# Patient Record
Sex: MALE | Race: WHITE | NOT HISPANIC OR LATINO | URBAN - METROPOLITAN AREA
[De-identification: names, ages, dates, MRNs, and addresses within clinical notes are randomized per-mention and may not be internally consistent; named-entity substitution may affect disease eponyms.]

---

## 2019-09-15 ENCOUNTER — EMERGENCY (EMERGENCY)
Facility: HOSPITAL | Age: 34
LOS: 0 days | Discharge: HOME | End: 2019-09-15
Attending: EMERGENCY MEDICINE
Payer: COMMERCIAL

## 2019-09-15 VITALS
OXYGEN SATURATION: 100 % | DIASTOLIC BLOOD PRESSURE: 88 MMHG | HEART RATE: 87 BPM | SYSTOLIC BLOOD PRESSURE: 144 MMHG | RESPIRATION RATE: 18 BRPM

## 2019-09-15 VITALS — HEIGHT: 67 IN | WEIGHT: 210.1 LBS
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
## 2019-09-15 DIAGNOSIS — S05.02XA INJURY OF CONJUNCTIVA AND CORNEAL ABRASION WITHOUT FOREIGN BODY, LEFT EYE, INITIAL ENCOUNTER: ICD-10-CM
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
## 2019-09-15 DIAGNOSIS — H57.89 OTHER SPECIFIED DISORDERS OF EYE AND ADNEXA: ICD-10-CM

## 2019-09-15 DIAGNOSIS — H20.9 UNSPECIFIED IRIDOCYCLITIS: ICD-10-CM

## 2019-09-15 DIAGNOSIS — S05.01XA INJURY OF CONJUNCTIVA AND CORNEAL ABRASION WITHOUT FOREIGN BODY, RIGHT EYE, INITIAL ENCOUNTER: ICD-10-CM

## 2019-09-15 DIAGNOSIS — M19.90 UNSPECIFIED OSTEOARTHRITIS, UNSPECIFIED SITE: ICD-10-CM

## 2019-09-15 DIAGNOSIS — L40.9 PSORIASIS, UNSPECIFIED: ICD-10-CM

## 2019-09-15 PROCEDURE — 99283 EMERGENCY DEPT VISIT LOW MDM: CPT

## 2019-09-15 RX ORDER — ACETAMINOPHEN 500 MG
650 TABLET ORAL ONCE
Refills: 0 | Status: COMPLETED | OUTPATIENT
Start: 2019-09-15 | End: 2019-09-15

## 2019-09-15 RX ORDER — PREDNISOLONE SODIUM PHOSPHATE 1 %
1 DROPS OPHTHALMIC (EYE) ONCE
Refills: 0 | Status: DISCONTINUED | OUTPATIENT
Start: 2019-09-15 | End: 2019-09-15

## 2019-09-15 RX ADMIN — Medication 650 MILLIGRAM(S): at 16:45

## 2019-09-15 RX ADMIN — Medication 60 MILLIGRAM(S): at 16:45

## 2019-09-15 RX ADMIN — Medication 1 DROP(S): at 16:46

## 2019-09-15 RX ADMIN — Medication 650 MILLIGRAM(S): at 16:47

## 2019-09-15 NOTE — ED STATDOCS - PROGRESS NOTE DETAILS
34 yr. old male PMH: Psoriatic Arthritis presents to ED with red eyes bilaterally onset 4 days ago. using antibiotic eye drops with little relief. + blurry vision and pressure behind left eye. Seen and examined by attending in Intake. Plan: Eye exam and Prednisone F/U with Opthamologist. Will F/U with patient. William [facility] Right Eye =20/15 Left Eye 20/15 Both Eyes 20/13 Both eyes Tetracaine 1 drop instilled into both eyes lid everted no FB, floureseine stain with + dye uptake in both eyes under crump lamp. William NP Will not give Prednisolone eye drops as discussed with Dr. Merchant due to + corneal abrasions on eye exam. William NP Will not give Prednisolone eye drops as discussed with Dr. Merchant due to + corneal abrasions , small, bilaterally, on eye exam. William NP

## 2019-09-15 NOTE — ED STATDOCS - PATIENT PORTAL LINK FT
You can access the FollowMyHealth Patient Portal offered by [facility] by registering at the following website: [email or web address]. By joining [employer]’s FollowMyHealth portal, you will also be able to view your health information using other applications (apps) compatible with our system.

## 2019-09-15 NOTE — ED ADULT TRIAGE NOTE - CHIEF COMPLAINT QUOTE
Pt ambulatory to triage for evaluation of bilateral eye irritation, was being treated for conjunctivitis but feels as if it is not getting any better. Pt reports pressure behind left eye

## 2019-09-15 NOTE — ED STATDOCS - EYES, MLM
clear bilaterally.  Pupils equal, round, and reactive to light. +psoriatic plaque to left eyelids BL, left side of nose with clear watery discharge.

## 2019-09-15 NOTE — ED STATDOCS - CLINICAL SUMMARY MEDICAL DECISION MAKING FREE TEXT BOX
Plan: Fluorecin stain for corneal abrasion, PO steroids, eye drops steroids, tylenol and f/o with rheumatologist.

## 2019-09-15 NOTE — ED STATDOCS - CARE PLAN
Principal Discharge DX:	Iritis  Secondary Diagnosis:	Corneal abrasion of both eyes, initial encounter  Secondary Diagnosis:	Psoriasis

## 2019-09-15 NOTE — ED STATDOCS - CHPI ED SYMPTOM NEG
How Severe Is It?: moderate Is This A New Presentation, Or A Follow-Up?: Follow Up Isotretinoin no dysuria/no fever/no abdominal distention

## 2019-09-15 NOTE — ED STATDOCS - OBJECTIVE STATEMENT
35 y/o F with PMHx of Arthritis presenting to the ED c/o eye redness. Patient was seen by PCD x4 days ago, and said that he most likely had pink eye, and prescribed drops to use 3 times a day, but has seen no improvement. patient presents to ED today after he started to feel pressure behind his left eye, and came to the ER for further evaluation. Denies any visual change.  Patient has no further complaints at this time. 35 y/o F with PMHx of Arthritis presenting to the ED c/o eye redness. Patient was seen by PCD x4 days ago, and said that he most likely had pink eye, and prescribed drops to use 3 times a day, but has seen no improvement. patient presents to ED today after he started to feel pressure behind his left eye, and came to the ER for further evaluation. Patient has history of cirrhosis, and says that he contacted his rheumatologist. Denies any visual change.  Patient has no further complaints at this time. 35 y/o M  presenting to the ED c/o eye redness. Patient was seen by PCD x4 days ago, and said that he most likely had pink eye, and prescribed drops to use 3 times a day, but has seen no improvement. patient presents to ED today after he started to feel pressure behind his left eye, and came to the ER for further evaluation. Patient has history of psoriasis and psoriatic arthritis, and says that he contacted his rheumatologist. Denies any visual change.  Patient has no further complaints at this time. No fevers. NO trauma to eye. Eyes and skin do not itch. states his new med for psoriasis cosentyx is not working because he developed mouth ulcers. has never had psoriasis plaques on his face before either. was switched to cosentyx from humira in march 2019. MD TERRI

## 2021-02-24 NOTE — ED STATDOCS - DR. NAME
Merchant Surgeon/Pathologist Verbiage (Will Incorporate Name Of Surgeon From Intro If Not Blank): operated in two distinct and integrated capacities as the surgeon and pathologist.